# Patient Record
Sex: MALE | Race: WHITE
[De-identification: names, ages, dates, MRNs, and addresses within clinical notes are randomized per-mention and may not be internally consistent; named-entity substitution may affect disease eponyms.]

---

## 2022-10-24 PROBLEM — Z00.00 ENCOUNTER FOR PREVENTIVE HEALTH EXAMINATION: Status: ACTIVE | Noted: 2022-10-24

## 2022-11-15 ENCOUNTER — APPOINTMENT (OUTPATIENT)
Dept: OTOLARYNGOLOGY | Facility: CLINIC | Age: 47
End: 2022-11-15

## 2022-11-15 VITALS
BODY MASS INDEX: 31.5 KG/M2 | TEMPERATURE: 96 F | HEART RATE: 60 BPM | DIASTOLIC BLOOD PRESSURE: 63 MMHG | HEIGHT: 71 IN | WEIGHT: 225 LBS | SYSTOLIC BLOOD PRESSURE: 107 MMHG

## 2022-11-15 DIAGNOSIS — R42 DIZZINESS AND GIDDINESS: ICD-10-CM

## 2022-11-15 DIAGNOSIS — H61.22 IMPACTED CERUMEN, LEFT EAR: ICD-10-CM

## 2022-11-15 PROCEDURE — 99204 OFFICE O/P NEW MOD 45 MIN: CPT | Mod: 25

## 2022-11-15 PROCEDURE — 69210 REMOVE IMPACTED EAR WAX UNI: CPT | Mod: LT

## 2022-11-15 RX ORDER — ATORVASTATIN CALCIUM 20 MG/1
20 TABLET, FILM COATED ORAL
Qty: 90 | Refills: 0 | Status: ACTIVE | COMMUNITY
Start: 2022-05-20

## 2022-11-15 RX ORDER — ALLOPURINOL 100 MG/1
100 TABLET ORAL
Qty: 60 | Refills: 0 | Status: ACTIVE | COMMUNITY
Start: 2022-05-20

## 2022-11-15 RX ORDER — ADALIMUMAB 40MG/0.4ML
40 KIT SUBCUTANEOUS
Qty: 2 | Refills: 0 | Status: ACTIVE | COMMUNITY
Start: 2022-03-31

## 2022-11-15 NOTE — ASSESSMENT
[FreeTextEntry1] : 47M here for initial evaluation. For the past month, he c/o episodes of intense room spinning. They occur when he lays down or gets up or moves his head abruptly, perhaps to the left? They last no more than 20 seconds and resolve on their own until the next episode occurs. In between episodes he is fine. In the past he has this occur several times over the past 10-15yrs and only once or twice at a time, but this is now persisting daily for the past month. Initially there was some neck pain which has since resolved. There is no otorrhea, otalgia, tinnitus or hearing loss. On exam, cerumen was removed from the right ear. The rest of the head and neck exam is unremarkable.\par History c/w benign paroxysmal positional vertigo which was discussed at length. For now, avoid triggering movements. Will send for VNG in interim and reviewed epley maneuvers. Will touch base w pt after VNG.

## 2022-11-15 NOTE — PHYSICAL EXAM
[de-identified] : no masses/lesions [FreeTextEntry1] : Ad: EAC w cerumen removed w curet, TM intact, ME clear\par As: EAC clear, TM intact, ME clear [Midline] : trachea located in midline position [Normal] : no rashes

## 2022-11-15 NOTE — HISTORY OF PRESENT ILLNESS
[de-identified] : 47M here for initial evaluation.\par \par For the past month, he c/o episodes of intense room spinning. They occur when he lays down or gets up or moves his head abruptly, perhaps to the left? They last no more than 20 seconds and resolve on their own until the next episode occurs. In between episodes he is fine.\par In the past he has this occur very rarely and only once or twice at a time, but this is now persisting daily for the past month. Initially there was some neck pain which has since resolved. \par There is no otorrhea, otalgia, tinnitus or hearing loss.\par He is on humira for psoriatic arthritis which has been a 'life saver.'\par \par ROS otherwise unremarkable.

## 2022-11-15 NOTE — CONSULT LETTER
[Dear  ___] : Dear  [unfilled], [Courtesy Letter:] : I had the pleasure of seeing your patient, [unfilled], in my office today. [Consult Closing:] : Thank you very much for allowing me to participate in the care of this patient.  If you have any questions, please do not hesitate to contact me. [Sincerely,] : Sincerely, [DrSandy  ___] : Dr. BRADEN [FreeTextEntry3] : Edinson More MD\par Department of Otolaryngology - Head and Neck Surgery\par [facility]

## 2022-11-30 ENCOUNTER — APPOINTMENT (OUTPATIENT)
Dept: OTOLARYNGOLOGY | Facility: CLINIC | Age: 47
End: 2022-11-30

## 2022-11-30 PROCEDURE — 92540 BASIC VESTIBULAR EVALUATION: CPT

## 2022-11-30 PROCEDURE — 92537 CALORIC VSTBLR TEST W/REC: CPT
